# Patient Record
Sex: MALE | Race: WHITE | NOT HISPANIC OR LATINO | Employment: UNEMPLOYED | URBAN - METROPOLITAN AREA
[De-identification: names, ages, dates, MRNs, and addresses within clinical notes are randomized per-mention and may not be internally consistent; named-entity substitution may affect disease eponyms.]

---

## 2023-09-20 ENCOUNTER — HOSPITAL ENCOUNTER (EMERGENCY)
Facility: HOSPITAL | Age: 33
Discharge: HOME | End: 2023-09-20
Attending: EMERGENCY MEDICINE
Payer: MEDICAID

## 2023-09-20 ENCOUNTER — APPOINTMENT (OUTPATIENT)
Dept: GENERAL RADIOLOGY | Facility: HOSPITAL | Age: 33
End: 2023-09-20
Payer: MEDICAID

## 2023-09-20 VITALS
SYSTOLIC BLOOD PRESSURE: 146 MMHG | DIASTOLIC BLOOD PRESSURE: 90 MMHG | RESPIRATION RATE: 18 BRPM | HEART RATE: 60 BPM | HEIGHT: 72 IN | WEIGHT: 131 LBS | BODY MASS INDEX: 17.74 KG/M2 | TEMPERATURE: 98.2 F | OXYGEN SATURATION: 98 %

## 2023-09-20 DIAGNOSIS — M79.641 PAIN OF RIGHT HAND: Primary | ICD-10-CM

## 2023-09-20 DIAGNOSIS — S60.031A CONTUSION OF RIGHT MIDDLE FINGER WITHOUT DAMAGE TO NAIL, INITIAL ENCOUNTER: ICD-10-CM

## 2023-09-20 PROCEDURE — 99282 EMERGENCY DEPT VISIT SF MDM: CPT

## 2023-09-20 PROCEDURE — 73130 X-RAY EXAM OF HAND: CPT

## 2023-09-20 NOTE — Clinical Note
[facility] EMERGENCY DEPARTMENT  [address] [place] [address]  Phone: [phone]    Roberth Goldstein was seen and treated in our emergency department on 9/20/2023.  He may return to work on 09/21/2023.         Thank you for choosing [facility].    Jah Peters PA

## 2023-09-20 NOTE — Clinical Note
[facility] EMERGENCY DEPARTMENT  [address] [place] [address]  Phone: [phone]    Roberth Goldstein was seen and treated in our emergency department on 9/20/2023.  He may return to work on 09/21/2023.         Thank you for choosing [facility].    Jah Peters PA

## 2023-09-21 NOTE — DISCHARGE INSTRUCTIONS
Please follow-up with hand surgery in 1 week if symptoms persist as needed.  Please take Tylenol and ibuprofen as needed for pain control.  Please return to the ER if symptoms worsen.

## 2023-09-21 NOTE — ED PROVIDER NOTES
Subjective     Provider in Triage Note  Due to significant overcrowding in the emergency department patient was initially seen and evaluated in triage.  Provider in triage recommended patient placement in the treatment area to initiate therapy and movement to an ER bed as soon as possible.    Patient is a 32-year-old white male who presents today for complaints of an injury to his right hand.  He got angry and punched a steel walker injuring his right hand today.    History of Present Illness      HPI reviewed and same as above.  Patient states pain is seen to his right middle finger and worse with making a fist and better with resting his right hand.  Patient denies any pain into his wrist.  Patient denies any numbness or tingling of fingertips.    Review of Systems   Constitutional:  Negative for chills, fatigue and fever.   HENT:  Negative for congestion, sore throat, tinnitus and trouble swallowing.    Eyes:  Negative for photophobia, discharge and visual disturbance.   Respiratory:  Negative for cough and shortness of breath.    Cardiovascular:  Negative for chest pain and leg swelling.   Gastrointestinal:  Negative for abdominal pain, diarrhea, nausea and vomiting.   Genitourinary:  Negative for dysuria, flank pain and urgency.   Musculoskeletal:  Negative for arthralgias and myalgias.        Right hand pain.   Skin:  Negative for rash.   Neurological:  Negative for dizziness and headaches.   Psychiatric/Behavioral:  Negative for confusion.      History reviewed. No pertinent past medical history.    No Known Allergies    History reviewed. No pertinent surgical history.    History reviewed. No pertinent family history.    Social History     Socioeconomic History    Marital status: Single           Objective   Physical Exam  Vitals and nursing note reviewed.   Constitutional:       General: He is not in acute distress.     Appearance: Normal appearance. He is well-developed. He is not diaphoretic.   HENT:       Head: Normocephalic and atraumatic.      Right Ear: External ear normal.      Left Ear: External ear normal.      Nose: Nose normal.      Mouth/Throat:      Mouth: Mucous membranes are moist.      Pharynx: No oropharyngeal exudate.   Eyes:      Extraocular Movements: Extraocular movements intact.      Conjunctiva/sclera: Conjunctivae normal.      Pupils: Pupils are equal, round, and reactive to light.   Cardiovascular:      Rate and Rhythm: Normal rate and regular rhythm.      Pulses: Normal pulses.      Heart sounds: Normal heart sounds.      Comments: S1, S2 audible.  Pulmonary:      Effort: Pulmonary effort is normal. No respiratory distress.      Breath sounds: Normal breath sounds. No wheezing.      Comments: On room air.  Abdominal:      General: Bowel sounds are normal. There is no distension.      Palpations: Abdomen is soft.      Tenderness: There is no abdominal tenderness. There is no guarding or rebound.   Musculoskeletal:         General: No tenderness or deformity. Normal range of motion.      Cervical back: Normal range of motion.   Skin:     General: Skin is warm.      Capillary Refill: Capillary refill takes less than 2 seconds.      Findings: No erythema or rash.   Neurological:      General: No focal deficit present.      Mental Status: He is alert and oriented to person, place, and time.      Cranial Nerves: No cranial nerve deficit.   Psychiatric:         Mood and Affect: Mood normal.         Behavior: Behavior normal.       Procedures           ED Course      Labs Reviewed - No data to display                                       Medical Decision Making  Problems Addressed:  Contusion of right middle finger without damage to nail, initial encounter: complicated acute illness or injury  Pain of right hand: complicated acute illness or injury    Amount and/or Complexity of Data Reviewed  Radiology: ordered.      Differential diagnosis: Finger fracture, hand fracture, not meant to be an  "all-inclusive list.    Chart review: Upon chart review, back in 2016 patient had x-ray of the left hand and found to have fracture of fourth middle phalanx.    EKG:  not indicated    Imaging: X-ray right hand independently interpreted by myself shows no fracture or dislocation.  XR Hand 3+ View Right   Final Result   Impression:   No acute osseous abnormality.         Electronically Signed: Branden Smithchapoizabellabrooke, DO     9/20/2023 10:00 PM EDT     Workstation ID: [id number]        Labs: Not indicated    Vitals:  /90 (BP Location: Left arm, Patient Position: Lying)   Pulse 60   Temp 98.2 °F (36.8 °C) (Oral)   Resp 18   Ht 182.9 cm (72\")   Wt 59.4 kg (131 lb)   SpO2 98%   BMI 17.77 kg/m²    Medications given:  Medications - No data to display    Procedures:  not indicated  MDM: Patient is a 32-year-old male comes in complaining of right hand pain after punching a steel walker.  X-ray right hand unremarkable for acute findings.  Patient has good range of motion of right hand and fingers despite pain however most reduced of right middle finger.  Right middle finger was buddy taped with right ring finger.  Patient was instructed to follow-up with hand surgery if symptoms persist and patient voiced understanding.  Patient denies any other symptoms. see full discharge instructions for further details.  Plan discussed with patient and is agreeable with plan.      Final diagnoses:   Pain of right hand   Contusion of right middle finger without damage to nail, initial encounter       ED Disposition  ED Disposition       ED Disposition   Discharge    Condition   Stable    Comment   --               [facility] EMERGENCY DEPARTMENT  [address] [address] [phone]  Go in 1 day  As needed, If symptoms worsen    [facility] [place]  [address]  [facility] [address] [phone]  Schedule an appointment as soon as possible for a visit in 1 week  for " hand surgery follow up         Medication List      No changes were made to your prescriptions during this visit.            Jah Peters PA  09/20/23 [id number]